# Patient Record
Sex: MALE | Race: WHITE | HISPANIC OR LATINO | URBAN - METROPOLITAN AREA
[De-identification: names, ages, dates, MRNs, and addresses within clinical notes are randomized per-mention and may not be internally consistent; named-entity substitution may affect disease eponyms.]

---

## 2023-06-23 ENCOUNTER — EMERGENCY (EMERGENCY)
Facility: HOSPITAL | Age: 43
LOS: 1 days | Discharge: HOME | End: 2023-06-23
Attending: EMERGENCY MEDICINE
Payer: COMMERCIAL

## 2023-06-23 VITALS
WEIGHT: 160.06 LBS | TEMPERATURE: 98 F | HEART RATE: 92 BPM | RESPIRATION RATE: 18 BRPM | SYSTOLIC BLOOD PRESSURE: 143 MMHG | DIASTOLIC BLOOD PRESSURE: 86 MMHG | OXYGEN SATURATION: 98 %

## 2023-06-23 PROCEDURE — 73080 X-RAY EXAM OF ELBOW: CPT | Mod: 26,LT

## 2023-06-23 PROCEDURE — 99284 EMERGENCY DEPT VISIT MOD MDM: CPT

## 2023-06-23 PROCEDURE — 73080 X-RAY EXAM OF ELBOW: CPT

## 2023-06-23 PROCEDURE — 73564 X-RAY EXAM KNEE 4 OR MORE: CPT | Mod: 26,LT

## 2023-06-23 PROCEDURE — 73564 X-RAY EXAM KNEE 4 OR MORE: CPT

## 2023-06-23 PROCEDURE — T1013: CPT

## 2023-06-23 RX ORDER — IBUPROFEN 200 MG
600 TABLET ORAL ONCE
Refills: 0 | Status: COMPLETED | OUTPATIENT
Start: 2023-06-23 | End: 2023-06-23

## 2023-06-23 RX ADMIN — Medication 600 MILLIGRAM(S): at 09:28

## 2023-06-23 NOTE — ED PROVIDER NOTE - NS ED ROS FT
Gen: denies fever, chills, fatigue, weight loss  Skin: +Skin abrasions. denies rashes, laceration, bruising  HEENT: denies visual changes, ear pain, nasal congestion, throat pain  Respiratory: denies MCGHEE, SOB, cough, wheezing  Cardiovascular: denies chest pain, palpitations, diaphoresis, LE edema  GI: denies abdominal pain, n/v/d  : denies dysuria, frequency, urgency, bowel/bladder incontinence  MSK: +L elbow pain, +L knee pain. Denies back pain, neck pain  Neuro: denies headache, dizziness, weakness, numbness
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
## 2023-06-23 NOTE — ED PROVIDER NOTE - NSFOLLOWUPINSTRUCTIONS_ED_ALL_ED_FT
- Return to the ED for any new or worsening symptoms.   - May take ibuprofen 600mg every 6 hours as needed for pain  - Keep abrasions clean, wash with soap and water, apply thin layer bacitracin or topical antibacterial ointment 2-3x/day    Contusion    A contusion is a deep bruise. Contusions are the result of a blunt injury to tissues and muscle fibers under the skin. The skin overlying the contusion may turn blue, purple, or yellow. Symptoms also include pain and swelling in the injured area.    SEEK IMMEDIATE MEDICAL CARE IF YOU HAVE ANY OF THE FOLLOWING SYMPTOMS: severe pain, numbness, tingling, pain, weakness, or skin color/temperature change in any part of your body distal to the injury.     - Regrese al servicio de urgencias por cualquier síntoma nuevo o que empeore.  - Puede pearl ibuprofeno 600 mg cada 6 horas según sea necesario para el dolor  - Mantenga las abrasiones limpias, lave con agua y jabón, aplique greta capa delgada de bacitracina o un ungüento antibacteriano tópico 2-3 veces al día.    Contusión    Greta contusión es un moretón profundo. Las contusiones son el resultado de greta lesión contundente en los tejidos y las fibras musculares debajo de la piel. La piel que recubre la contusión puede volverse jeimy, morada o amarilla. Los síntomas también incluyen dolor e hinchazón en el área lesionada.    BUSQUE ATENCIÓN MÉDICA INMEDIATA SI TIENE ALGUNO DE LOS SIGUIENTES SÍNTOMAS: dolor intenso, entumecimiento, hormigueo, dolor, debilidad o cambios en el color o la temperatura de la piel en cualquier parte del cuerpo distal a la lesión. Tarsorrhaphy Text: A tarsorrhaphy was performed using Frost sutures.

## 2023-06-23 NOTE — ED ADULT NURSE NOTE - NSFALLUNIVINTERV_ED_ALL_ED
Bed/Stretcher in lowest position, wheels locked, appropriate side rails in place/Call bell, personal items and telephone in reach/Instruct patient to call for assistance before getting out of bed/chair/stretcher/Non-slip footwear applied when patient is off stretcher/[place] to call system/Physically safe environment - no spills, clutter or unnecessary equipment/Purposeful proactive rounding/Room/bathroom lighting operational, light cord in reach

## 2023-06-23 NOTE — ED PROVIDER NOTE - PATIENT PORTAL LINK FT
You can access the FollowMyHealth Patient Portal offered by [facility] by registering at the following website: [email or web address]. By joining [employer]’s FollowMyHealth portal, you will also be able to view your health information using other applications (apps) compatible with our system.

## 2023-06-23 NOTE — ED PROVIDER NOTE - PHYSICAL EXAMINATION
T(C): 36.9 (06-23-23 @ 08:19), Max: 36.9 (06-23-23 @ 08:19)  HR: 92 (06-23-23 @ 08:19) (92 - 92)  BP: 143/86 (06-23-23 @ 08:19) (143/86 - 143/86)  RR: 18 (06-23-23 @ 08:19) (18 - 18)  SpO2: 98% (06-23-23 @ 08:19) (98% - 98%)    CONSTITUTIONAL: Well groomed, no apparent distress  EYES: PERRLA and symmetric, EOMI  ENMT: Oral mucosa with moist membranes.              NECK: Supple, symmetric and without tracheal deviation   RESP: No respiratory distress, no use of accessory muscles  CV: RRR, +S1S2  GI: Soft, NT, ND  MSK: Tenderness to the left knee, full ROM. Tenderness to left elbow, full ROM. Normal gait; No digital clubbing or cyanosis  SKIN: Small 1 inch laceration to the right calf, no edema, fluctuance to area. Small minor scrape to the left elbow  NEURO: A&O X 3 T(C): 36.9 (06-23-23 @ 08:19), Max: 36.9 (06-23-23 @ 08:19)  HR: 92 (06-23-23 @ 08:19) (92 - 92)  BP: 143/86 (06-23-23 @ 08:19) (143/86 - 143/86)  RR: 18 (06-23-23 @ 08:19) (18 - 18)  SpO2: 98% (06-23-23 @ 08:19) (98% - 98%)    CONSTITUTIONAL: Well appearing, no apparent distress  HEAD: NCAT  EYES: PERRLA and symmetric, EOMI  ENMT: Oral mucosa with moist membranes.   NECK: no midline c-spine ttp or step offs. Supple, symmetric and without tracheal deviation   RESP: No respiratory distress, no use of accessory muscles. CTA b/l  CV: RRR, +S1S2  GI: Soft, NT, ND  MSK: No deformity or swelling. Full ROM ext x 4, minimal bony ttp anterior L knee and L elbow. compartments soft/compressible. ambulatory with steady gait  VASC: peripheral pulses 2+ b/l, symmetric throughout  SKIN: Superficial 2cm abrasion to R anterior shin. Very superficial abrasion to L medial elbow  NEURO: A&O X 3, sensorimotor intact

## 2023-06-23 NOTE — ED PROVIDER NOTE - CARE PLAN
1 Principal Discharge DX:	Left knee pain  Secondary Diagnosis:	Left elbow pain  Secondary Diagnosis:	Skin abrasion

## 2023-07-18 ENCOUNTER — EMERGENCY (EMERGENCY)
Facility: HOSPITAL | Age: 43
LOS: 1 days | Discharge: LEFT BEFORE DISCHARGE | End: 2023-07-18
Payer: SELF-PAY

## 2023-07-18 VITALS
OXYGEN SATURATION: 98 % | TEMPERATURE: 99 F | RESPIRATION RATE: 18 BRPM | HEART RATE: 91 BPM | SYSTOLIC BLOOD PRESSURE: 138 MMHG | WEIGHT: 169.09 LBS | HEIGHT: 67 IN | DIASTOLIC BLOOD PRESSURE: 78 MMHG

## 2023-07-18 PROBLEM — Z78.9 OTHER SPECIFIED HEALTH STATUS: Chronic | Status: ACTIVE | Noted: 2023-06-23

## 2023-07-18 PROCEDURE — L9991: CPT
